# Patient Record
Sex: MALE | Race: OTHER | NOT HISPANIC OR LATINO | Employment: FULL TIME | URBAN - METROPOLITAN AREA
[De-identification: names, ages, dates, MRNs, and addresses within clinical notes are randomized per-mention and may not be internally consistent; named-entity substitution may affect disease eponyms.]

---

## 2024-01-12 ENCOUNTER — HOSPITAL ENCOUNTER (EMERGENCY)
Facility: HOSPITAL | Age: 64
Discharge: HOME | End: 2024-01-12
Attending: EMERGENCY MEDICINE
Payer: COMMERCIAL

## 2024-01-12 ENCOUNTER — APPOINTMENT (EMERGENCY)
Dept: RADIOLOGY | Facility: HOSPITAL | Age: 64
End: 2024-01-12
Payer: COMMERCIAL

## 2024-01-12 VITALS
RESPIRATION RATE: 18 BRPM | TEMPERATURE: 98.4 F | DIASTOLIC BLOOD PRESSURE: 89 MMHG | SYSTOLIC BLOOD PRESSURE: 156 MMHG | OXYGEN SATURATION: 99 % | HEART RATE: 94 BPM | WEIGHT: 178 LBS

## 2024-01-12 DIAGNOSIS — S82.831A CLOSED FRACTURE OF DISTAL END OF RIGHT FIBULA, UNSPECIFIED FRACTURE MORPHOLOGY, INITIAL ENCOUNTER: Primary | ICD-10-CM

## 2024-01-12 PROCEDURE — 99283 EMERGENCY DEPT VISIT LOW MDM: CPT

## 2024-01-12 PROCEDURE — 99284 EMERGENCY DEPT VISIT MOD MDM: CPT | Performed by: EMERGENCY MEDICINE

## 2024-01-12 PROCEDURE — 73610 X-RAY EXAM OF ANKLE: CPT

## 2024-01-12 RX ORDER — OXYCODONE HYDROCHLORIDE 5 MG/1
5 TABLET ORAL ONCE
Status: COMPLETED | OUTPATIENT
Start: 2024-01-12 | End: 2024-01-12

## 2024-01-12 RX ORDER — IBUPROFEN 600 MG/1
600 TABLET ORAL EVERY 6 HOURS PRN
Qty: 30 TABLET | Refills: 0 | Status: SHIPPED | OUTPATIENT
Start: 2024-01-12 | End: 2024-01-22

## 2024-01-12 RX ORDER — IBUPROFEN 400 MG/1
400 TABLET ORAL ONCE
Status: COMPLETED | OUTPATIENT
Start: 2024-01-12 | End: 2024-01-12

## 2024-01-12 RX ORDER — OXYCODONE HYDROCHLORIDE AND ACETAMINOPHEN 5; 325 MG/1; MG/1
1 TABLET ORAL EVERY 4 HOURS PRN
Qty: 12 TABLET | Refills: 0 | Status: SHIPPED | OUTPATIENT
Start: 2024-01-12 | End: 2024-01-22

## 2024-01-12 RX ADMIN — IBUPROFEN 400 MG: 400 TABLET, FILM COATED ORAL at 19:18

## 2024-01-12 RX ADMIN — OXYCODONE HYDROCHLORIDE 5 MG: 5 TABLET ORAL at 19:18

## 2024-01-12 NOTE — Clinical Note
Akshat Yeh was seen and treated in our emergency department on 1/12/2024.        No work until cleared by Family Doctor/Orthopedics        Diagnosis:     Akshat  .    He may return on this date: 01/22/2024         If you have any questions or concerns, please don't hesitate to call.      Nelli Solorio MD    ______________________________           _______________          _______________  Hospital Representative                              Date                                Time

## 2024-01-13 NOTE — ED PROVIDER NOTES
History  Chief Complaint   Patient presents with    Ankle Injury     Pt c/o right ankle pain after falling on the ice approx 1 hr ago, pt denies hitting head      HPI    None       Past Medical History:   Diagnosis Date    Hypertension        History reviewed. No pertinent surgical history.    History reviewed. No pertinent family history.  I have reviewed and agree with the history as documented.    E-Cigarette/Vaping     E-Cigarette/Vaping Substances     Social History     Tobacco Use    Smoking status: Never    Smokeless tobacco: Never   Substance Use Topics    Drug use: Never       Review of Systems    Physical Exam  Physical Exam    Vital Signs  ED Triage Vitals   Temperature Pulse Respirations Blood Pressure SpO2   01/12/24 1723 01/12/24 1723 01/12/24 1723 01/12/24 1723 01/12/24 1723   98.4 °F (36.9 °C) 94 18 156/89 99 %      Temp Source Heart Rate Source Patient Position - Orthostatic VS BP Location FiO2 (%)   01/12/24 1723 01/12/24 1723 01/12/24 1723 01/12/24 1723 --   Tympanic Monitor Sitting Left arm       Pain Score       01/12/24 1918       6           Vitals:    01/12/24 1723   BP: 156/89   Pulse: 94   Patient Position - Orthostatic VS: Sitting         Visual Acuity      ED Medications  Medications   oxyCODONE (ROXICODONE) IR tablet 5 mg (5 mg Oral Given 1/12/24 1918)   ibuprofen (MOTRIN) tablet 400 mg (400 mg Oral Given 1/12/24 1918)       Diagnostic Studies  Results Reviewed       None                   XR ankle 3+ views RIGHT   ED Interpretation by Nelli Solorio MD (01/12 1845)   Nondisplaced distal fibular fx      Final Result by Bradley Landon Kocher, MD (01/13 1346)      Nondisplaced oblique fracture of the distal fibula.   Associated soft tissue swelling over the lateral malleolus.            Resident: RONY Patterson I, the attending radiologist, have reviewed the images and agree with the final report above.      Workstation performed: [id number]                     Procedures  Procedures         ED Course                               SBIRT 20yo+      Flowsheet Row Most Recent Value   Initial Alcohol Screen: US AUDIT-C     1. How often do you have a drink containing alcohol? 0 Filed at: 01/12/2024 1725   2. How many drinks containing alcohol do you have on a typical day you are drinking?  0 Filed at: 01/12/2024 1725   3a. Male UNDER 65: How often do you have five or more drinks on one occasion? 0 Filed at: 01/12/2024 1725   Audit-C Score 0 Filed at: 01/12/2024 1725   AARON: How many times in the past year have you...    Used an illegal drug or used a prescription medication for non-medical reasons? Never Filed at: 01/12/2024 1725                      Medical Decision Making  Amount and/or Complexity of Data Reviewed  Radiology: ordered and independent interpretation performed.    Risk  Prescription drug management.             Disposition  Final diagnoses:   Closed fracture of distal end of right fibula, unspecified fracture morphology, initial encounter     Time reflects when diagnosis was documented in both MDM as applicable and the Disposition within this note       Time User Action Codes Description Comment    1/12/2024  7:02 PM Nelli Solorio Add [S82.831A] Closed fracture of distal end of right fibula, unspecified fracture morphology, initial encounter           ED Disposition       ED Disposition   Discharge    Condition   Stable    Date/Time   Fri Jan 12, 2024 1907    Comment   Akshat Yeh discharge to home/self care.                   Follow-up Information       Follow up With Specialties Details Why Contact Info Additional Information    [facility] Emergency Department Emergency Medicine Go to  If symptoms worsen [address] [facility] [id number] [phone] [facility] Emergency Department, [address] [place], [place], [address]    [facility] [place]  Orthopedic Surgery Go in 2 days  [address] [facility] [address]  [facility] [id number] [phone] [facility] [place], [address] [facility] [address], [place], [place], [id number] [phone]    [facility]  Go in 1 week  [address] [facility] [address]  [phone]             Discharge Medication List as of 1/12/2024  7:07 PM        START taking these medications    Details   ibuprofen (MOTRIN) 600 mg tablet Take 1 tablet (600 mg total) by mouth every 6 (six) hours as needed for mild pain for up to 10 days, Starting Fri 1/12/2024, Until Mon 1/22/2024 at 2359, Normal      oxyCODONE-acetaminophen (PERCOCET) 5-325 mg per tablet Take 1 tablet by mouth every 4 (four) hours as needed for moderate pain for up to 10 days Max Daily Amount: 6 tablets, Starting Fri 1/12/2024, Until Mon 1/22/2024 at 2359, Normal                 PDMP Review       None            ED Provider  Electronically Signed by           Disposition       ED Disposition   Discharge    Condition   Stable    Date/Time   Fri Jan 12, 2024 1907    Comment   Akshat Yeh discharge to home/self care.                   Follow-up Information       Follow up With Specialties Details Why Contact Info Additional Information    [facility] Emergency Department Emergency Medicine Go to  If symptoms worsen [address] [facility] [address]  [phone] [facility] Emergency Department, [address] [place], [place], [address]    [facility] [place] Orthopedic Surgery Go in 2 days  [address] [facility]  [address]  [facility] [id number] [phone] [facility] [place], [address] [facility] [address], [place], [place], [id number] [phone]    [facility]  Go in 1 week  [address] [facility] [address]  [phone]             Discharge Medication List as of 1/12/2024  7:07 PM        START taking these medications    Details   ibuprofen (MOTRIN) 600 mg tablet Take 1 tablet (600 mg total) by mouth every 6 (six) hours as needed for mild pain for up to 10 days, Starting Fri 1/12/2024, Until Mon 1/22/2024 at 2359, Normal      oxyCODONE-acetaminophen (PERCOCET) 5-325 mg per tablet Take 1 tablet by mouth every 4 (four) hours as needed for moderate pain for up to 10 days Max Daily Amount: 6 tablets, Starting Fri 1/12/2024, Until Mon 1/22/2024 at 2359, Normal                 PDMP Review       None            ED Provider  Electronically Signed by             Nelli Solorio MD  01/22/24 8572

## 2024-01-16 VITALS
HEIGHT: 67 IN | HEART RATE: 69 BPM | BODY MASS INDEX: 27.88 KG/M2 | DIASTOLIC BLOOD PRESSURE: 96 MMHG | SYSTOLIC BLOOD PRESSURE: 171 MMHG

## 2024-01-16 DIAGNOSIS — S82.831A CLOSED TRAUMATIC NONDISPLACED FRACTURE OF DISTAL END OF RIGHT FIBULA, INITIAL ENCOUNTER: Primary | ICD-10-CM

## 2024-01-16 PROCEDURE — 99204 OFFICE O/P NEW MOD 45 MIN: CPT | Performed by: FAMILY MEDICINE

## 2024-01-16 RX ORDER — AMLODIPINE BESYLATE AND BENAZEPRIL HYDROCHLORIDE 5; 20 MG/1; MG/1
1 CAPSULE ORAL DAILY
COMMUNITY
Start: 2023-12-22

## 2024-01-16 NOTE — PATIENT INSTRUCTIONS
Over the counter vitamins:    - vitamin D 2000 IU daily    - calcium 500 mg daily    - turmeric vitamin at least 1000 mg daily    - tart cherry vitamin at least 1000 mg daily    Diclofenac gel 3 times daily for 10 days    Epsom Salt bath    Ice 20 mins twice a day for next 3 days

## 2024-01-16 NOTE — LETTER
January 16, 2024     Patient: Akshat Yeh  YOB: 1960  Date of Visit: 1/16/2024      To Whom it May Concern:    Akshat Yeh is under my professional care. Akshat was seen in my office on 1/16/2024. Akshat is unable to return to work at this time due to right ankle fracture.    Akshat will be re-evaluated in 4 weeks.    If you have any questions or concerns, please don't hesitate to call.         Sincerely,          Salas Leyva DO        CC: No Recipients

## 2024-01-16 NOTE — PROGRESS NOTES
Assessment/Plan:  Assessment/Plan   Diagnoses and all orders for this visit:    Closed traumatic nondisplaced fracture of distal end of right fibula, initial encounter  -     Ambulatory Referral to Orthopedic Surgery    Other orders  -     amLODIPine-benazepril (LOTREL 5-20) 5-20 MG per capsule; Take 1 capsule by mouth daily      63-year-old male employed by [employer] with onset of right ankle pain and swelling from injury at home on 1/12/2024.  Discussed with patient physical exam, radiographs, impression, and plan.  X-rays right ankle noted for nondisplaced oblique fracture distal fibula.  Physical exam noted for right ankle lateral soft tissue swelling.  There is tenderness at the distal fibula and anterior ankle.  He has limited range of motion with dorsiflexion, inversion, and eversion.  He has 4+/5 strength with dorsiflexion, inversion and eversion.  Passive external rotation and syndesmosis squeeze are unremarkable.  He has normal sensation.  Clinical impression is that he sustained acute fracture.  I advised that surgery is not warranted.  I discussed treatment regimen of cam boot stabilization, supplements, icing, and topical anti-inflammatory.  He may continue with cam boot for stabilize ankle.  He is to continue use of crutches to limit weightbearing as tolerated.  He is to take vitamin D 2000 IU daily, calcium 500 mg daily, turmeric vitamin at least 1000 mg daily and tart cherry vitamin at least 1000 mg daily.  He is to apply topical diclofenac gel 3 times daily for the next 10 days.  He is to do icing 20 minutes twice a day for the next 3 days and elevate.  Advised that after 1 week he may gradually increase weightbearing as tolerated with use of cam boot.  He will follow-up in 4 weeks at which point we will repeat x-rays of the right ankle and he will be reevaluated.        Subjective:   Patient ID: Akshat Yeh is a 63 y.o. male.  Chief Complaint   Patient presents with    Right Ankle - Pain         63-year-old male employed by [employer] presents for evaluation of right ankle pain and swelling sustained from injury at home 1/12/2024.  He was plowing the driveway when he tripped and twisted his ankle.  His ankle twisted and inversion.  He fell to the ground and pain.  He had pain described as sudden in onset, generalized to the ankle but worse to the anterior and lateral aspects, radiating proximally to the lower leg, worse with bearing weight, and associated with limited range of motion.  He states initially he was able to bear weight.  He presented to the emergency room where x-ray evaluation was noted for fracture.  He was provided crutches and cam boot, prescribed medication for pain, and advised to follow-up with orthopedic care.  He states that when he is sitting/resting he has little pain.    Ankle Pain  This is a new problem. The current episode started in the past 7 days. The problem occurs daily. The problem has been unchanged. Associated symptoms include arthralgias and joint swelling. Pertinent negatives include no abdominal pain, chest pain, chills, fever, numbness, rash, sore throat or weakness. The symptoms are aggravated by standing, walking and twisting. He has tried rest, NSAIDs and immobilization for the symptoms. The treatment provided mild relief.         The following portions of the patient's history were reviewed and updated as appropriate: He  has a past medical history of Hypertension.  He has No Known Allergies..    Review of Systems   Constitutional:  Negative for chills and fever.   HENT:  Negative for sore throat.    Eyes:  Negative for visual disturbance.   Respiratory:  Negative for shortness of breath.    Cardiovascular:  Negative for chest pain.   Gastrointestinal:  Negative for abdominal pain.   Genitourinary:  Negative for flank pain.   Musculoskeletal:  Positive for arthralgias and joint swelling.   Skin:  Negative for rash and wound.   Neurological:  Negative for weakness and  "numbness.   Hematological:  Does not bruise/bleed easily.   Psychiatric/Behavioral:  Negative for self-injury.        Objective:  Vitals:    01/16/24 1152   BP: (!) 171/96   Pulse: 69   Height: 5' 7\" (1.702 m)      Right Ankle Exam     Muscle Strength   Dorsiflexion:  5/5  Plantar flexion:  4+/5    Other   Sensation: normal       Right Knee Exam     Muscle Strength   The patient has normal right knee strength.    Tenderness   The patient is experiencing no tenderness.     Range of Motion   Extension:  normal           Observations     Right Ankle/Foot   Negative for deformity.     Tenderness     Right Ankle/Foot   Tenderness in the anterior ankle, anterior talofibular ligament and lateral malleolus. No tenderness in the Achilles insertion, fifth metatarsal base, calcaneofibular ligament, deltoid ligament, dorsum foot, medial malleolus, peroneal tendon, posterior talofibular ligament and proximal Achilles.     Strength/Myotome Testing     Right Ankle/Foot   Dorsiflexion: 5  Plantar flexion: 4+  Inversion: 4+  Eversion: 4+    Tests     Right Ankle/Foot   Negative for anterior drawer, posterior drawer, syndesmosis squeeze and syndesmosis external rotation.       Physical Exam  Vitals and nursing note reviewed.   Constitutional:       Appearance: Normal appearance. He is well-developed. He is not ill-appearing or diaphoretic.   HENT:      Head: Normocephalic and atraumatic.      Right Ear: External ear normal.      Left Ear: External ear normal.   Eyes:      Conjunctiva/sclera: Conjunctivae normal.   Neck:      Trachea: No tracheal deviation.   Cardiovascular:      Rate and Rhythm: Normal rate.   Pulmonary:      Effort: Pulmonary effort is normal. No respiratory distress.   Abdominal:      General: There is no distension.   Musculoskeletal:         General: Swelling, tenderness and signs of injury present. No deformity.      Right ankle: Tenderness present over the lateral malleolus and ATF ligament. No medial " malleolus, CF ligament, posterior TF ligament or base of 5th metatarsal tenderness. Anterior drawer test negative.      Right foot: No deformity.   Skin:     General: Skin is warm and dry.      Coloration: Skin is not jaundiced or pale.   Neurological:      Mental Status: He is alert and oriented to person, place, and time.   Psychiatric:         Mood and Affect: Mood normal.         Behavior: Behavior normal.         Thought Content: Thought content normal.         Judgment: Judgment normal.         I have personally reviewed pertinent films in PACS and my interpretation is  .  Nondisplaced oblique fracture of the distal fibula.

## 2024-02-16 ENCOUNTER — OFFICE VISIT (OUTPATIENT)
Dept: OBGYN CLINIC | Facility: CLINIC | Age: 64
End: 2024-02-16
Payer: COMMERCIAL

## 2024-02-16 ENCOUNTER — TELEPHONE (OUTPATIENT)
Dept: OBGYN CLINIC | Facility: HOSPITAL | Age: 64
End: 2024-02-16

## 2024-02-16 ENCOUNTER — APPOINTMENT (OUTPATIENT)
Dept: RADIOLOGY | Facility: CLINIC | Age: 64
End: 2024-02-16
Payer: COMMERCIAL

## 2024-02-16 VITALS
HEIGHT: 67 IN | SYSTOLIC BLOOD PRESSURE: 155 MMHG | DIASTOLIC BLOOD PRESSURE: 93 MMHG | HEART RATE: 58 BPM | WEIGHT: 178 LBS | BODY MASS INDEX: 27.94 KG/M2

## 2024-02-16 DIAGNOSIS — S82.831A CLOSED TRAUMATIC NONDISPLACED FRACTURE OF DISTAL END OF RIGHT FIBULA, INITIAL ENCOUNTER: ICD-10-CM

## 2024-02-16 DIAGNOSIS — S82.831D CLOSED TRAUMATIC NONDISPLACED FRACTURE OF DISTAL END OF RIGHT FIBULA WITH ROUTINE HEALING, SUBSEQUENT ENCOUNTER: Primary | ICD-10-CM

## 2024-02-16 PROCEDURE — 73610 X-RAY EXAM OF ANKLE: CPT

## 2024-02-16 PROCEDURE — 99213 OFFICE O/P EST LOW 20 MIN: CPT | Performed by: FAMILY MEDICINE

## 2024-02-16 NOTE — TELEPHONE ENCOUNTER
Gordon    Pt is requesting to return to work March 1st. Ptis scheduled for 2/26.       Callback: [phone]

## 2024-02-16 NOTE — LETTER
February 16, 2024     Patient: Akshat Yeh  YOB: 1960  Date of Visit: 2/16/2024      To Whom it May Concern:    Akshat Yeh is under my professional care. Akshat was seen in my office on 2/16/2024. Akshat may work with restrictions: SEDENTARY DUTIES ONLY  - No standing/walking for more than 2 hours per shift  - wear ankle brace at all times    Akshat will be re-evaluated 3 weeks.    If you have any questions or concerns, please don't hesitate to call.         Sincerely,          Salas Leyva DO        CC: No Recipients

## 2024-02-16 NOTE — PROGRESS NOTES
Assessment/Plan:  Assessment/Plan   Diagnoses and all orders for this visit:    Closed traumatic nondisplaced fracture of distal end of right fibula with routine healing, subsequent encounter  -     XR ankle 3+ vw right; Future  -     Brace  -     Ambulatory Referral to Physical Therapy; Future      63-year-old male employed by [employer] who sustained fracture to right ankle from injury at home on 1/12/2024. Discussed with patient physical exam, radiographs, impression, plan.  X-rays noted for healing of lateral malleus fracture without change in alignment.  Physical exam right ankle noted for lateral soft tissue swelling.  There is no appreciable bony or soft tissue tenderness about the ankle.  He has limited range of motion with dorsiflexion and eversion however intact strength of the ankle.  Clinical impression is that he is improving, but not fully recovered from his injury.  At this time I recommend transitioning out of cam boot into lace up ankle brace.  He is to start physical therapy as soon as possible and do home exercises as directed.  He will follow-up in 3 weeks at which point he will be reevaluated.          Subjective:   Patient ID: Akshat Yeh is a 63 y.o. male.  Chief Complaint   Patient presents with    Right Ankle - Follow-up        63-year-old male employed by [employer] following up for right lateral malleolus fracture sustained from injury at home on 1/12/2024.  He was last seen by me 4 weeks ago at which point he was advised to continue with cam boot and start taking supplements.  He reports feeling much better since last visit.  At home he intermittently ambulates without cam boot.  He reports having pain described localized to the lateral ankle, achy and sore and sometimes sharp, worse with twisting particularly with maneuvers such as going up stairs, and improved with resting.    Ankle Pain  This is a new problem. The current episode started more than 1 month ago. The problem occurs daily. The problem  "has been gradually improving. Associated symptoms include arthralgias and joint swelling. Pertinent negatives include no numbness or weakness. The symptoms are aggravated by twisting. He has tried rest and immobilization for the symptoms. The treatment provided moderate relief.               Review of Systems   Musculoskeletal:  Positive for arthralgias and joint swelling.   Neurological:  Negative for weakness and numbness.       Objective:  Vitals:    02/16/24 1303   BP: 155/93   Pulse: 58   Weight: 80.7 kg (178 lb)   Height: 5' 7\" (1.702 m)      Right Ankle Exam     Range of Motion   Dorsiflexion:  5   Plantar flexion:  normal   Eversion:  5   Inversion:  normal     Muscle Strength   Dorsiflexion:  5/5  Plantar flexion:  5/5           Tenderness     Right Ankle/Foot   No tenderness in the Achilles insertion, anterior ankle, anterior talofibular ligament, fifth metatarsal base, calcaneofibular ligament, deltoid ligament, lateral malleolus, medial malleolus, peroneal tendon, posterior tibial tendon, posterior talofibular ligament, proximal Achilles and talar dome.     Strength/Myotome Testing     Right Ankle/Foot   Dorsiflexion: 5  Plantar flexion: 5  Inversion: 5  Eversion: 5      Physical Exam  Vitals and nursing note reviewed.   Constitutional:       Appearance: Normal appearance. He is well-developed. He is not ill-appearing or diaphoretic.   HENT:      Head: Normocephalic and atraumatic.      Right Ear: External ear normal.      Left Ear: External ear normal.   Eyes:      Conjunctiva/sclera: Conjunctivae normal.   Neck:      Trachea: No tracheal deviation.   Cardiovascular:      Rate and Rhythm: Normal rate.   Pulmonary:      Effort: Pulmonary effort is normal. No respiratory distress.   Abdominal:      General: There is no distension.   Musculoskeletal:         General: Swelling present. No tenderness.      Right ankle: No lateral malleolus, medial malleolus, ATF ligament, CF ligament, posterior TF ligament " or base of 5th metatarsal tenderness.   Skin:     General: Skin is warm and dry.      Coloration: Skin is not jaundiced or pale.   Neurological:      Mental Status: He is alert and oriented to person, place, and time.   Psychiatric:         Mood and Affect: Mood normal.         Behavior: Behavior normal.         Thought Content: Thought content normal.         Judgment: Judgment normal.         I have personally reviewed pertinent films in PACS and my interpretation is  .  X-rays noted for healing of lateral malleus fracture without change in alignment.